# Patient Record
Sex: MALE | Race: ASIAN | NOT HISPANIC OR LATINO
[De-identification: names, ages, dates, MRNs, and addresses within clinical notes are randomized per-mention and may not be internally consistent; named-entity substitution may affect disease eponyms.]

---

## 2023-05-05 PROBLEM — Z00.00 ENCOUNTER FOR PREVENTIVE HEALTH EXAMINATION: Status: ACTIVE | Noted: 2023-05-05

## 2023-05-10 PROBLEM — R91.8 LUNG MASS: Status: ACTIVE | Noted: 2023-05-10

## 2023-05-11 ENCOUNTER — APPOINTMENT (OUTPATIENT)
Dept: THORACIC SURGERY | Facility: CLINIC | Age: 63
End: 2023-05-11
Payer: MEDICAID

## 2023-05-11 VITALS
RESPIRATION RATE: 18 BRPM | TEMPERATURE: 98.8 F | HEIGHT: 66.14 IN | SYSTOLIC BLOOD PRESSURE: 109 MMHG | DIASTOLIC BLOOD PRESSURE: 72 MMHG | OXYGEN SATURATION: 95 % | BODY MASS INDEX: 17.36 KG/M2 | HEART RATE: 106 BPM | WEIGHT: 108 LBS

## 2023-05-11 DIAGNOSIS — R91.8 OTHER NONSPECIFIC ABNORMAL FINDING OF LUNG FIELD: ICD-10-CM

## 2023-05-11 DIAGNOSIS — Z78.9 OTHER SPECIFIED HEALTH STATUS: ICD-10-CM

## 2023-05-11 DIAGNOSIS — Z86.39 PERSONAL HISTORY OF OTHER ENDOCRINE, NUTRITIONAL AND METABOLIC DISEASE: ICD-10-CM

## 2023-05-11 DIAGNOSIS — C25.9 MALIGNANT NEOPLASM OF PANCREAS, UNSPECIFIED: ICD-10-CM

## 2023-05-11 DIAGNOSIS — Z87.891 PERSONAL HISTORY OF NICOTINE DEPENDENCE: ICD-10-CM

## 2023-05-11 PROCEDURE — 99245 OFF/OP CONSLTJ NEW/EST HI 55: CPT

## 2023-05-13 PROBLEM — C25.9 PANCREATIC CANCER: Status: ACTIVE | Noted: 2023-05-13

## 2023-05-13 PROBLEM — Z86.39 HISTORY OF DIABETES MELLITUS: Status: RESOLVED | Noted: 2023-05-13 | Resolved: 2023-05-13

## 2023-05-13 PROBLEM — Z87.891 FORMER SMOKER: Status: ACTIVE | Noted: 2023-05-13

## 2023-05-13 RX ORDER — METFORMIN HYDROCHLORIDE 500 MG/1
500 TABLET, COATED ORAL
Refills: 0 | Status: ACTIVE | COMMUNITY

## 2023-05-13 NOTE — HISTORY OF PRESENT ILLNESS
[FreeTextEntry1] : Mr. IJEOMA GALVAN, 62 year old male, former smoker, w/ hx of Pancreatic Ca, GERD, DM, TB, who presented to [facility] ED with abd pain in 1/2022, CT A/P 1/7/22 showed 2.3cm pancreatic body mass with moderate intrahepatic and extrahepatic biliary ductal dilatation. Pancreatic mass biopsied on 1/20/2022, pathology showed moderately differentiated adenoCa. He was placed stent for obstructive jaundice by Dr. Ray. Not operable pe surgical consultation. He started chemo with Dr. Eckert on 2/22/2022, chemo on held at some point due to TB. He was recommended for palliative care, pt refused. \par \par CT Chest/A/P w/contrast on 2/17/23:\par - a cavitary mass in Lt apex, 4.6 x 2.3 cm with peripheral calcification  with additional smaller nodular opacities in JD with largest one 1.6 cm\par - multiple nodular opacities in LLL, mostly superior segment,  with several cavitation, largest one 1.5 cm \par - multiple nodular opacities in RLL with several cavitation, largest one 2 cm\par - a biliary stent extending from common\par - hepatic duct to the duodenum\par - a mass seen involving the pancreatic head and body 4 x 2.2 cm, inseparable from biliary stent, superior mesenteric vein and splenic vein\par \par He was scheduled for left lung mass biopsy, however non complaint. \par \par Pt presents today for CT Sx consultation, referred by Dr. Malorie Eckert. He admits nausea and vomiting at times, very fatigue and poor appetite. \par

## 2023-05-13 NOTE — DATA REVIEWED
[FreeTextEntry1] : I have independently reviewed the following:\par CT Chest/A/P w/contrast on 2/17/23

## 2023-05-13 NOTE — CONSULT LETTER
[Dear  ___] : Dear  [unfilled], [Consult Letter:] : I had the pleasure of evaluating your patient, [unfilled]. [( Thank you for referring [unfilled] for consultation for _____ )] : Thank you for referring [unfilled] for consultation for [unfilled] [Please see my note below.] : Please see my note below. [Consult Closing:] : Thank you very much for allowing me to participate in the care of this patient.  If you have any questions, please do not hesitate to contact me. [Sincerely,] : Sincerely, [FreeTextEntry2] : Malorie Eckert MD (ref/Hem/Onc) [FreeTextEntry3] : Jerman Gonzales MD, MPH \par System Director of Thoracic Surgery \par Director of [facility] [place] \par Professor Cardiovascular & Thoracic Surgery  \par [employer] at [facility]\par

## 2023-05-13 NOTE — PHYSICAL EXAM
[Exaggerated Use Of Accessory Muscles For Inspiration] : no accessory muscle use [Respiration, Rhythm And Depth] : normal respiratory rhythm and effort [Auscultation Breath Sounds / Voice Sounds] : lungs were clear to auscultation bilaterally [Apical Impulse] : the apical impulse was normal [Heart Rate And Rhythm] : heart rate was normal and rhythm regular [Examination Of The Chest] : the chest was normal in appearance [Heart Sounds] : normal S1 and S2 [Chest Visual Inspection Thoracic Asymmetry] : no chest asymmetry [Bowel Sounds] : normal bowel sounds [Abdomen Soft] : soft [Abdomen Tenderness] : non-tender [No CVA Tenderness] : no ~M costovertebral angle tenderness [Involuntary Movements] : no involuntary movements were seen [Skin Color & Pigmentation] : normal skin color and pigmentation [Skin Turgor] : normal skin turgor [] : no rash [No Focal Deficits] : no focal deficits [Oriented To Time, Place, And Person] : oriented to person, place, and time [Impaired Insight] : insight and judgment were intact [Affect] : the affect was normal

## 2023-05-13 NOTE — ASSESSMENT
[FreeTextEntry1] : Mr. IJEOMA GALVAN, 62 year old male, former smoker, w/ hx of Pancreatic Ca, GERD, DM, TB, who presented to [facility] ED with abd pain in 1/2022, CT A/P 1/7/22 showed 2.3cm pancreatic body mass with moderate intrahepatic and extrahepatic biliary ductal dilatation. Pancreatic mass biopsied on 1/20/2022, pathology showed moderately differentiated adenoCa. He was placed stent for obstructive jaundice by Dr. Ray. Not operable pe surgical consultation. He started chemo with Dr. Eckert on 2/22/2022, chemo on held at some point due to TB. He was recommended for palliative care, pt refused. \par \par CT Chest/A/P w/contrast on 2/17/23:\par - a cavitary mass in Lt apex, 4.6 x 2.3 cm with peripheral calcification  with additional smaller nodular opacities in JD with largest one 1.6 cm\par - multiple nodular opacities in LLL, mostly superior segment,  with several cavitation, largest one 1.5 cm \par - multiple nodular opacities in RLL with several cavitation, largest one 2 cm\par - a biliary stent extending from common\par - hepatic duct to the duodenum\par - a mass seen involving the pancreatic head and body 4 x 2.2 cm, inseparable from biliary stent, superior mesenteric vein and splenic vein\par \par He was scheduled for left lung mass biopsy, however non complaint. \par \par I have reviewed the patient's medical records and diagnostic images at time of this office consultation and have made the following recommendation:\par 1. CT scan reviewed today, pt has locally advanced pancreatic cancer, nonresectable with poor prognosis, was ever recommended palliative care, now with lung mass, suspect it is the metastases from the pancreatic Ca, biopsy of the lung mass does not change the care plan, pt's condition is very poor now, therefore I don’t recommend any invasive procedure/ biopsy of the lung mass.\par 2. To follow up with Dr. Gandara for TB. \par 3. RTC prn. \par \par \par \par \par I, LIZY Borja, personally performed the evaluation and management (E/M) services for this established patient who follow up today with an existing condition.  That E/M includes conducting the examination, assessing all new/exacerbated/existing conditions, and establishing a plan of care.  Today, my ACP, Annette Sullivan NP, was here to observe my evaluation and management services for this existing condition to be followed going forward.\par \par